# Patient Record
Sex: MALE | URBAN - METROPOLITAN AREA
[De-identification: names, ages, dates, MRNs, and addresses within clinical notes are randomized per-mention and may not be internally consistent; named-entity substitution may affect disease eponyms.]

---

## 2018-08-14 ENCOUNTER — TELEPHONE (OUTPATIENT)
Dept: FAMILY MEDICINE CLINIC | Facility: CLINIC | Age: 48
End: 2018-08-14

## 2018-08-14 NOTE — TELEPHONE ENCOUNTER
Pt says he know TJ from the [facility] and was highly recommended to see TJ. He would like to know if Ed[facility] would take him as a new pt. He hasn't seen a PCP in many many years. He was just seen at [facility] in [facility] for his left leg swelling.

## 2018-10-01 ENCOUNTER — LAB ENCOUNTER (OUTPATIENT)
Dept: LAB | Age: 48
End: 2018-10-01
Attending: INTERNAL MEDICINE

## 2018-10-01 DIAGNOSIS — C49.22 SARCOMA OF LOWER EXTREMITY, LEFT (HCC): Primary | ICD-10-CM

## 2018-10-01 PROCEDURE — 87077 CULTURE AEROBIC IDENTIFY: CPT

## 2018-10-01 PROCEDURE — 87186 SC STD MICRODIL/AGAR DIL: CPT

## 2018-10-01 PROCEDURE — 87150 DNA/RNA AMPLIFIED PROBE: CPT

## 2018-10-01 PROCEDURE — 87040 BLOOD CULTURE FOR BACTERIA: CPT

## 2018-10-01 PROCEDURE — 36415 COLL VENOUS BLD VENIPUNCTURE: CPT

## 2021-09-24 NOTE — TELEPHONE ENCOUNTER
Interventional Radiology -- Consult Note    Patient Name:  Todd Penn   MRN:  [id number]   Date:  9/24/2021     Referring Provider: Alban Read MD     Chief Complaint:   Chief Complaint   Patient presents with   • Shortness of Breath           HPI: 64 year old male admit to [facility] 9/22/2021 complaining of 3-week history of significant left-sided chest pain and shortness of breath.  He was found to have a large left pleural effusion.  He underwent thoracentesis (by Dr. Lange) September 9, 2021 with cytology positive for adenocarcinoma consistent with lung primary.  IR consulted for evaluation of pleurX catheter placement.    Histories:  Past Medical History:   Diagnosis Date   • Anorectal polyp 1/12/2021   • Biceps tendon rupture     left   • Blood clot in vein 05/2020    right leg reason for plavix   • Chronic cough    • Chronic pain syndrome     s/p trauma with cervical fracture   • DVT (deep venous thrombosis) (CMS/HCC)    • Environmental allergies    • Eosinophilia    • Essential (primary) hypertension    • GERD (gastroesophageal reflux disease)    • HLD (hyperlipidemia)    • Lumbar disc disease    • Mild persistent asthma     under control   • Muscle wasting and atrophy, not elsewhere classified, left shoulder    • Nasal polyposis    • Pneumonia    • Respiratory failure with hypoxia (CMS/HCC)     RESOLVED   • Rib fractures    • Right clavicle fracture    • SOB (shortness of breath)     <4 mets    • Solitary pulmonary nodule 10/20/2017    likely due to a granuloma     Past Surgical History:   Procedure Laterality Date   • Biceps tendon repair Left    • Bronchoscopy,transbronch biopsy  07/19/2010    Bronchoscopy   • Cervical spine surgery      x 2   • Egd     • Examination under anesthesia  01/20/2021    excision of anorectal polyp   • Popliteal artery angioplasty Right 05/01/2020     Family History   Problem Relation Age of Onset   • Dementia/Alzheimers Mother    • Cancer, Liver  Called pt and confirmed that [facility] is not accepting new patients at this time.  Advised pt that Dr.s Ruben Mills in [facility] are taking new patients, as well as Dr.s Niru Esposito and Jovita Posada in [facility] Father    • Cancer Sister    • Cancer, Colon Neg Hx      Social History     Tobacco Use   • Smoking status: Former Smoker     Quit date: 3/24/2000     Years since quittin.5   • Smokeless tobacco: Never Used   • Tobacco comment:    Substance Use Topics   • Alcohol use: Yes     Alcohol/week: 5.0 standard drinks     Types: 5 Standard drinks or equivalent per week     Comment: OCC   • Drug use: Never        Inpatient Medications  • oxymetazoline  2 spray Each Nare 2 times per day   • lidocaine  1 patch Transdermal Daily   • ipratropium-albuterol  3 mL Nebulization 4x Daily Resp   • methylPREDNISolone (SOLU-Medrol) IV  40 mg Intravenous 3 times per day   • docusate sodium  100 mg Oral BID   • sodium chloride (PF)  2 mL Intracatheter 2 times per day   • azithromycin  500 mg Intravenous Daily   • sodium chloride (PF)  2 mL Intracatheter 2 times per day   • fentaNYL  1 patch Transdermal Q3 Days   • DULoxetine  30 mg Oral Daily      • dextrose 5 % / sodium chloride 0.45% infusion 10 mL/hr at 21 1004   • dextrose 5 % / sodium chloride 0.9% infusion Stopped (21 0900)      HYDROmorphone, HYDROcodone-homatropine, benzonatate, sodium chloride, sodium chloride, ketorolac (TORADOL) injection, LORazepam     Allergies:    ALLERGIES:   Allergen Reactions   • Oxycodone Hcl Other (See Comments)     Easily angered/mood changes   • Tramadol Other (See Comments)     Becomes very upset and angry        Review of Systems  Constitutional: Negative for fever and chills.   Skin: Negative for rash.   HEENT: Negative for eye drainage, rhinorrhea, ear pain, sore throat.  Respiratory: + shortness of breath.    Cardiovascular: Negative for chest pain, chest pressure or palpitations.   Gastrointestinal: Negative for nausea, vomiting, diarrhea.   Genitourinary: Negative for dysuria, urgency, frequency or hematuria.  Extremities:  Negative for joint swelling or joint pain.  Neurologic:  Negative for change in sensory or motor  function.   Endocrine: Negative for heat or cold intolerance.  Psychiatric: Negative for change in mood or mentation.    Physical Exam  Vitals with min/max:    Vital Last Value 24 Hour Range   Temperature 98.4 °F (36.9 °C) (09/24/21 0500) Temp  Min: 96.8 °F (36 °C)  Max: 98.4 °F (36.9 °C)   Pulse (!) 106 (09/24/21 0500) Pulse  Min: 84  Max: 120   Respiratory 18 (09/24/21 0930) Resp  Min: 16  Max: 37   Non-Invasive  Blood Pressure 110/68 (09/24/21 0500) BP  Min: 110/68  Max: 191/115   Pulse Oximetry 96 % (09/24/21 0500) SpO2  Min: 91 %  Max: 97 %   Arterial   Blood Pressure   No data recorded       Intake/Output Summary (Last 24 hours) at 9/24/2021 0944  Last data filed at 9/24/2021 0600  Gross per 24 hour   Intake 1309.33 ml   Output 975 ml   Net 334.33 ml       General:  Alert, cooperative, conversive.  Skin:  Warm and dry without rash.    Head:  Normocephalic-atraumatic.   Neck:  Trachea is midline.     Eyes:  Normal conjunctivae and sclerae.   ENT:  Mucous membranes are moist.   Cardiovascular:  Tachycardia.  Respiratory:   Normal respiratory effort.  Symmetrical expansion.  Gastrointestinal:  Soft and nontender.      Musculoskeletal:  No deformity or edema.   Back:  Normal alignment.  No costovertebral angle tenderness.  Neurologic:  Alert and Oriented times 3.  No focal deficits.  Psychiatric:  Cooperative.  Appropriate mood and affect.    Labs:  Admission on 09/22/2021   Component Date Value Ref Range Status   • Ventricular Rate EKG/Min (BPM) 09/22/2021 100   Final   • NH-Interval (MSEC) 09/22/2021 159   Final   • QRS-Interval (MSEC) 09/22/2021 102   Final   • QT-Interval (MSEC) 09/22/2021 334   Final   • QTc 09/22/2021 391   Final   • P Axis (Degrees) 09/22/2021 37   Final   • R Axis (Degrees) 09/22/2021 10   Final   • T Axis (Degrees) 09/22/2021 57   Final   • REPORT TEXT 09/22/2021    Final                    Value:SINUS TACHYCARDIA  PROBABLE INFERIOR MYOCARDIAL INFARCTION, PROBABLY OLD  ABNORMAL  ECG  UNCONFIRMED REPORT  Confirmed by JARRELL MCINTYRE M.D. ([id number]) on 9/22/2021 2:37:03 PM     • Sodium 09/22/2021 142  135 - 145 mmol/L Final   • Potassium 09/22/2021 3.7  3.4 - 5.1 mmol/L Final   • Chloride 09/22/2021 104  98 - 107 mmol/L Final   • Carbon Dioxide 09/22/2021 26  21 - 32 mmol/L Final   • Anion Gap 09/22/2021 16  10 - 20 mmol/L Final   • Glucose 09/22/2021 115* 65 - 99 mg/dL Final   • BUN 09/22/2021 12  6 - 20 mg/dL Final   • Creatinine 09/22/2021 0.95  0.67 - 1.17 mg/dL Final   • Glomerular Filtration Rate 09/22/2021 84  >=60 Final    eGFR results = or >60 mL/min/1.73m2 = Normal kidney function. Estimated GFR calculated using the 2009 CKD-EPI creatinine equation.     • BUN/ Creatinine Ratio 09/22/2021 13  7 - 25 Final   • Calcium 09/22/2021 8.1* 8.4 - 10.2 mg/dL Final   • Bilirubin, Total 09/22/2021 0.8  0.2 - 1.0 mg/dL Final   • GOT/AST 09/22/2021 28  <=37 Units/L Final   • GPT/ALT 09/22/2021 34  <64 Units/L Final   • Alkaline Phosphatase 09/22/2021 67  45 - 117 Units/L Final   • Albumin 09/22/2021 2.9* 3.6 - 5.1 g/dL Final   • Protein, Total 09/22/2021 6.8  6.4 - 8.2 g/dL Final   • Globulin 09/22/2021 3.9  2.0 - 4.0 g/dL Final   • A/G Ratio 09/22/2021 0.7* 1.0 - 2.4 Final   • Troponin I, Ultra Sensitive 09/22/2021 0.02  <=0.04 ng/mL Final   • Rapid SARS-COV-2 by PCR 09/22/2021 Not Detected  Not Detected / Detected / Presumptive Positive / Inhibitors present Final   • Isolation Guidelines 09/22/2021    Final    Do not use this test result as the sole decision-maker for discontinuation of isolation.   Clinical evaluation should be considered for other respiratory illness requiring transmission-based isolation.    -    No fever (<99.0 F/37.2 C) for at least 24 hours without the use of fever-reducing medications    AND  -    Respiratory symptoms have improved or resolved (e.g. cough, shortness of breath)     AND  -    COVID-19 negative test    See COVID-19 Deisolation Resource Guide   • Procedural  Comment 09/22/2021    Final    SARS-CoV-2 nucleic acid has not been detected indicating the absence of COVID-19.       Testing was performed using the [employer] Xpert Xpress SARS-CoV-2 RT-PCR assay that has been given Emergency Use Authorization (EUA) by the [place] Food and Drug Administration (FDA).  These results are considered definitive and do not need to be confirmed by another method.   • WBC 09/22/2021 9.4  4.2 - 11.0 K/mcL Final   • RBC 09/22/2021 5.63  4.50 - 5.90 mil/mcL Final   • HGB 09/22/2021 16.7  13.0 - 17.0 g/dL Final   • HCT 09/22/2021 51.8* 39.0 - 51.0 % Final   • MCV 09/22/2021 92.0  78.0 - 100.0 fl Final   • MCH 09/22/2021 29.7  26.0 - 34.0 pg Final   • MCHC 09/22/2021 32.2  32.0 - 36.5 g/dL Final   • RDW-CV 09/22/2021 14.9  11.0 - 15.0 % Final   • RDW-SD 09/22/2021 50.9* 39.0 - 50.0 fL Final   • PLT 09/22/2021 349  140 - 450 K/mcL Final   • NRBC 09/22/2021 0  <=0 /100 WBC Final   • Neutrophil, Percent 09/22/2021 80  % Final   • Lymphocytes, Percent 09/22/2021 6  % Final   • Mono, Percent 09/22/2021 7  % Final   • Eosinophils, Percent 09/22/2021 5  % Final   • Basophils, Percent 09/22/2021 1  % Final   • Immature Granulocytes 09/22/2021 1  % Final   • Absolute Neutrophils 09/22/2021 7.6  1.8 - 7.7 K/mcL Final   • Absolute Lymphocytes 09/22/2021 0.6* 1.0 - 4.0 K/mcL Final   • Absolute Monocytes 09/22/2021 0.7  0.3 - 0.9 K/mcL Final   • Absolute Eosinophils  09/22/2021 0.5  0.0 - 0.5 K/mcL Final   • Absolute Basophils 09/22/2021 0.1  0.0 - 0.3 K/mcL Final   • Absolute Immmature Granulocytes 09/22/2021 0.1  0.0 - 0.2 K/mcL Final   • Culture, Blood or Bone Marrow 09/22/2021 No Growth 1 Day.   Preliminary   • Culture, Blood or Bone Marrow 09/22/2021 No Growth 1 Day.   Preliminary   • Lactate, Venous 09/22/2021 2.3* 0.0 - 2.0 mmol/L Final   • PTT 09/22/2021 23  22 - 30 sec Final   • Prothrombin Time 09/22/2021 9.8  9.7 - 11.8 sec Final   • INR 09/22/2021 0.9    Final    INR Therapeutic Range: 2.0  to 3.0 (2.5 to 3.5 recommended for recurrent thrombotic episodes and mechanical prosthetic heart valves.)   • Lactate, Venous 09/22/2021 1.3  0.0 - 2.0 mmol/L Final   • Procalcitonin 09/23/2021 <0.05  <=0.09 ng/mL Final      Bacterial Sepsis:  <0.5 ng/mL:    Sepsis not likely. Localized bacterial infection possible.  0.5 - 2 ng/mL: Sepsis or other conditions possible  >2.0 ng/mL:    High risk of sepsis/septic shock    NOTE: If bacterial sepsis is of high clinical suspicion but PCT<2 ng/mL,  consider recheck PCT 6-24 hours after initial test.     Lower Respiratory Tract Infection (LRTI):  <0.1 ng/mL:       Bacterial infection highly unlikely  0.1 - 0.25 ng/mL: Bacterial infection unlikely  0.26 - 0.5 ng/mL: Bacterial infection likely  > 0.5 ng/mL:      Bacterial infection highly likely    NOTE: Patients with advanced CKD or medical/surgical trauma may have  elevated baseline PCT (>0.5 ng/mL) in the absence of bacterial infection. If  bacterial infection is suspected, consider repeat PCT in 2 to 4 days to guide de-escalation or discontinuation of antibiotic therapy.  Higher reference range cutoffs may be appropriate for patients <3 days old.     • Extra Tube 09/23/2021 Hold for Add Ons   Final   • Extra Tube 09/23/2021 Hold for Add Ons   Final   • CULTURE WITH GRAM STAIN, BRONCHIAL 09/23/2021 Culture in progress.   Preliminary   • Gram Stain 09/23/2021 Few Gram positive cocci.   Preliminary   • Gram Stain 09/23/2021 Few Gram positive bacilli.   Preliminary   • Gram Stain 09/23/2021 Few Polymorphonuclear cells.   Preliminary        Imaging:  Bronchoscopy   Final Result      XR CHEST PA OR AP 1 VIEW   Final Result   1. Moderate-sized left pleural fluid collection, larger today than on prior   exam.   Infiltrate in the left mid and lower lung, worse today than on prior exam.      Electronically Signed by: JANNY DAVILA MD    Signed on: 9/22/2021 9:54 AM              Impression/Plan         1. Malignant left pleural  effusion  -9/9/21 thoracentesis with positive cytology  -we will proceed with left sided pleurX catheter placement today, procedure was discussed in detail with pt and wife including R/B/A.    -CT chest with 1.7 cm lobulated subpleural mass in the left upper lung medially, pt also has a 3.6 cm mass lesion in the medial inferior aspect of the right anterior hepatic lobe near the gallbladder fossa.    -pt underwent bronchoscopy on 9/23/21, if cytology is non diagnostic we could proceed with liver bx.         Joie Patiño, CNP   9/24/2021   [id number]  J91.0  K76.9